# Patient Record
Sex: FEMALE | Race: WHITE | Employment: OTHER | URBAN - METROPOLITAN AREA
[De-identification: names, ages, dates, MRNs, and addresses within clinical notes are randomized per-mention and may not be internally consistent; named-entity substitution may affect disease eponyms.]

---

## 2017-03-23 ENCOUNTER — TELEPHONE (OUTPATIENT)
Dept: CARDIOLOGY | Facility: CLINIC | Age: 67
End: 2017-03-23

## 2017-09-13 ENCOUNTER — RADIANT APPOINTMENT (OUTPATIENT)
Dept: BONE DENSITY | Facility: CLINIC | Age: 67
End: 2017-09-13
Payer: COMMERCIAL

## 2017-09-13 DIAGNOSIS — Z78.0 ASYMPTOMATIC MENOPAUSAL STATE: ICD-10-CM

## 2017-09-13 PROCEDURE — 77080 DXA BONE DENSITY AXIAL: CPT | Performed by: INTERNAL MEDICINE

## 2017-09-16 ENCOUNTER — HEALTH MAINTENANCE LETTER (OUTPATIENT)
Age: 67
End: 2017-09-16

## 2019-04-30 ENCOUNTER — ANCILLARY PROCEDURE (OUTPATIENT)
Dept: BONE DENSITY | Facility: CLINIC | Age: 69
End: 2019-04-30
Payer: COMMERCIAL

## 2019-04-30 DIAGNOSIS — M81.0 AGE-RELATED OSTEOPOROSIS WITHOUT CURRENT PATHOLOGICAL FRACTURE: ICD-10-CM

## 2019-04-30 DIAGNOSIS — M85.80 OSTEOPENIA: ICD-10-CM

## 2019-04-30 DIAGNOSIS — Z85.3 HX OF BREAST CANCER: ICD-10-CM

## 2019-04-30 PROCEDURE — 77080 DXA BONE DENSITY AXIAL: CPT | Performed by: INTERNAL MEDICINE

## 2019-10-10 ENCOUNTER — CARE COORDINATION (OUTPATIENT)
Dept: GASTROENTEROLOGY | Facility: CLINIC | Age: 69
End: 2019-10-10

## 2019-12-16 ENCOUNTER — TRANSFERRED RECORDS (OUTPATIENT)
Dept: HEALTH INFORMATION MANAGEMENT | Facility: CLINIC | Age: 69
End: 2019-12-16

## 2020-08-06 ENCOUNTER — TRANSFERRED RECORDS (OUTPATIENT)
Dept: HEALTH INFORMATION MANAGEMENT | Facility: CLINIC | Age: 70
End: 2020-08-06

## 2020-08-20 ENCOUNTER — TRANSFERRED RECORDS (OUTPATIENT)
Dept: HEALTH INFORMATION MANAGEMENT | Facility: CLINIC | Age: 70
End: 2020-08-20

## 2020-10-05 ENCOUNTER — DOCUMENTATION ONLY (OUTPATIENT)
Dept: GASTROENTEROLOGY | Facility: CLINIC | Age: 70
End: 2020-10-05

## 2020-10-05 NOTE — PROGRESS NOTES
Received records and disk from [facility]. Sent records to Urgent scanning. Hand carried disk down to 1st floor imaging to be scanned in.    Keisha Crystal LPN

## 2021-01-28 ENCOUNTER — TELEPHONE (OUTPATIENT)
Dept: GASTROENTEROLOGY | Facility: CLINIC | Age: 71
End: 2021-01-28

## 2021-01-28 NOTE — TELEPHONE ENCOUNTER
[facility] Call Center    Phone Message    May a detailed message be left on voicemail: yes     Reason for Call: Other: Pt is requesting a humza back from Stephanie please. Pt is wanting to schedule a visit with Dr. Chao. Pt states she is becoming very upset as she has been trying to see Dr. Chao since the fall but has not heard back from the team. Pt states she had records sent from [facility], [facility], and [facility] Pt is requesting Stephanie give her a call to discuss if Dr. Chao will see her or if she should look into another provider. Please advise. Thank you!     Action Taken: Message routed to:  [facility] Panc and Bili    Travel Screening: Not Applicable

## 2021-01-29 NOTE — TELEPHONE ENCOUNTER
[facility]     Referring provider:  Phong Martinez MD      Referred to: [facility]     Provider Requested:  Dr. Chao     Referral Received: 1/28/20210     Records received: in CareEverywhere     Images received: none    Evaluation for: chronic pancreatitis     Clinical History (per RN review):     GI note from [place] 8/2020  Assessment:  #1 Chronic pancreatitis   #2 Remote history of tobacco and alcohol use  #3 Chronic abdominal pain   #4 Dilated CBD on endoscopic ultrasound in 2015  #5 Abnormal imaging of the pancreas on endoscopic ultrasound in 2015  It is certainly possible that she carries a diagnosis of chronic pancreatitis based on the abnormal imaging of the pancreas and the bile duct that was seen on the endoscopic ultrasound which was performed in 2015 in the setting of history of tobacco/alcohol use which are the typical risk factors for chronic pancreatitis and based on the nature of the abdominal pain. No evidence of pancreatic endocrine or exocrine insufficiency at this point. For further evaluation of her abdominal pain, would recommend an MRI/MRCP with secretin to better evaluate the pancreatic parenchyma and to evaluate for any local complications related to her chronic pancreatitis i.e. pancreatic ductal strictures or biliary strictures or pseudocysts that might be contributing to her debilitating pain despite her being on chronic narcotic use. I doubt that she carries a diagnosis of a gastroparesis given that was a 2-hour gastric emptying study that was performed while on chronic methadone use and the absence of any symptoms that are suggestive of gastroparesis. We discussed that MRI might be a better option than the CT given the potential of radiation in the CT scan, and we discussed that it is potentially safer option than the endoscopic ultrasound given the need for anesthesia. Despite her neck injury, she is able to lie flat for 45 minutes for an MRI and we  mutually agreed to proceed with that plan before we undergo any further testing    Her chronic pain is located in the epigastric region, radiates to the mid back, constant, ranges in intensity from 3-7 even while using pain medications, exacerbated by eating, not associated with any nausea or vomiting. No odynophagia or dysphagia, no weight loss, no hematemesis, melena, or hematochezia. She remembers being possibly diagnosed with chronic pancreatitis but this was not investigated further and she does not recall being on pancreatic enzyme replacement therapy and she denied any history of diabetes or pancreatic exocrine insufficiency.     [facility] 8/20/2020  Impression:   1. Unremarkable secretin MRCP. No pancreas divisum, or side branch visualization, with adequate duodenal outflow.  2. No gallstones in the gallbladder.  3. Intrahepatic and extrahepatic biliary dilatation, without obstructing calculus or mass. 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    Pt concerned w/ possible SOD  No noted elevations in LFTs during pain attacks    MD review date: clinic scheduled w/ Jeremie DIEHL Decision for clinic consultation/Orders:            Referral updates/Patient contacted: 1/29, called patient, she asked that I call back later in the afternoon

## 2021-03-17 ENCOUNTER — VIRTUAL VISIT (OUTPATIENT)
Dept: GASTROENTEROLOGY | Facility: CLINIC | Age: 71
End: 2021-03-17
Payer: COMMERCIAL

## 2021-03-17 VITALS — HEIGHT: 62 IN | BODY MASS INDEX: 25.4 KG/M2 | WEIGHT: 138 LBS

## 2021-03-17 DIAGNOSIS — R10.13 ABDOMINAL PAIN, EPIGASTRIC: Primary | ICD-10-CM

## 2021-03-17 PROCEDURE — 99205 OFFICE O/P NEW HI 60 MIN: CPT | Mod: 95 | Performed by: INTERNAL MEDICINE

## 2021-03-17 RX ORDER — SODIUM PHOSPHATE,MONO-DIBASIC 19G-7G/118
1 ENEMA (ML) RECTAL DAILY
COMMUNITY

## 2021-03-17 ASSESSMENT — PAIN SCALES - GENERAL: PAINLEVEL: SEVERE PAIN (7)

## 2021-03-17 ASSESSMENT — MIFFLIN-ST. JEOR: SCORE: 1104.21

## 2021-03-17 NOTE — LETTER
3/17/2021         RE: Cindy Chung  [address]  [address]  [place] [address]        Dear Colleague,    Thank you for referring your patient, Cindy Chung, to the [facility] [place]. Please see a copy of my visit note below.    Rick is a 71 year old who is being evaluated via a billable video visit.      Please send link invite to: [email or web address]  How would you like to obtain your AVS? Mail a copy  If the video visit is dropped, the invitation should be resent by: Send to e-mail at: [email or web address]  Will anyone else be joining your video visit? No    Video-Visit Details    Type of service:  Video Visit  Video Start Time: 1:30  Video End Time: 2:35    Originating Location (pt. Location): Home    Distant Location (provider location):  [facility] [place]     Rick is a very pleasant 71-year-old referred by her primary Dr. Rockwell for evaluation of longstanding right upper quadrant pain.  The referral was prompted by patient's word of mouth with providers at [facility].    At her excellent assessment by Dr. Martinez at [facility] is copied and pasted below to avoid redundancy and ensure detail    Her pain started quite a few years ago and is primarily epigastric radiating through to the back, constant, and is clearly exacerbated by eating.  It has become the dominant factor in her quality of life.  She is a retired  was largely disabled due to a cervical spine injury occurring almost 20 years ago.  She is managed by her primary physician and is on a number of neuro modulators including Lyrica, antidepressants including Zoloft and Cymbalta, and uses Percocet 5 mg of oxycodone three times a day.  She is quite limited by her pain in terms of engagement in activities.    Her work-up has been extensive and performed at multiple places including [facility], [facility] [place] [facility], salient findings going back  almost 20 years included a significantly dilated common bile duct of 11.5 mm, and intact gallbladder but without sludge or stones, normal liver enzymes when checked, and an endoscopic ultrasound at [facility] in 2014 by Dr. Gillette showing 5 out of 9 criteria for chronic pancreatitis and the persistently dilated common bile duct at 11.5 mm even at EUS.  Recent secretin MRCP done at [facility] [place] with advanced technique and interpretation suggest no chronic pancreatitis including normal T1 signal.    She also had a mildly delayed gastric emptying but was on narcotics at that time.  She was thought to have possible chronic pancreatitis and likely as a result of past history of heavy alcohol use and smoking.    To her knowledge she has not been tried on pancreatic enzymes although that has been mentioned by several of her GI consultants.  Her stools tend towards constipation with no suggestion of gross steatorrhea.    The rest of the history is as outlined below.    Impression: We spent 65 minutes more than 50% video counseling evaluating her extensive history, imaging, findings and discussing possible diagnoses.  Her clinical history is quite suggestive of chronic pancreatitis.  The EUS is fairly diagnostic as 5 out of 9 criteria have been shown to have 85+ percent specificity.  However the secretin MRCP which is generally the most accurate test for noncalcific disease was entirely normal.    We discussed that another possibility would be gallbladder dyskinesia and papillary stenosis although the constant nature of the pain and radiation to the back are more suggestive of chronic pancreatitis, the postprandial component may suggest the latter.  I agree with Dr. Martinez that this is unlikely to represent gastroparesis and the mildly delayed gastric emptying is probably result of narcotics    Recommendation:  #1 [facility] HIDA scan  #2 EUS with pancreatic function tests  #3 follow-up with me to consider  cholecystectomy versus trial of pancreatic enzyme replacement therapy  #4 we did discuss that if we go the cholecystectomy route that may actually exacerbate pain if there is a component of papillary stenosis and not that would follow with the likely ERCP but not to be considered while her gallbladder is intact  #5 we discussed that there is a very strong possibility that no specific cause that can be treated anatomically will be found          Progress Notes  - documented in this encounter  Phong Martinez MD - 08/06/2020 2:00 PM CDT  Formatting of this note might be different from the original.  [facility] INITIAL VISIT NOTE - Staff    Assessment:  #1 Chronic pancreatitis   #2 Remote history of tobacco and alcohol use  #3 Chronic abdominal pain   #4 Dilated CBD on endoscopic ultrasound in 2015  #5 Abnormal imaging of the pancreas on endoscopic ultrasound in 2015  It is certainly possible that she carries a diagnosis of chronic pancreatitis based on the abnormal imaging of the pancreas and the bile duct that was seen on the endoscopic ultrasound which was performed in 2015 in the setting of history of tobacco/alcohol use which are the typical risk factors for chronic pancreatitis and based on the nature of the abdominal pain. No evidence of pancreatic endocrine or exocrine insufficiency at this point. For further evaluation of her abdominal pain, would recommend an MRI/MRCP with secretin to better evaluate the pancreatic parenchyma and to evaluate for any local complications related to her chronic pancreatitis i.e. pancreatic ductal strictures or biliary strictures or pseudocysts that might be contributing to her debilitating pain despite her being on chronic narcotic use. I doubt that she carries a diagnosis of a gastroparesis given that was a 2-hour gastric emptying study that was performed while on chronic methadone use and the absence of any symptoms that are suggestive of gastroparesis. We discussed that  MRI might be a better option than the CT given the potential of radiation in the CT scan, and we discussed that it is potentially safer option than the endoscopic ultrasound given the need for anesthesia. Despite her neck injury, she is able to lie flat for 45 minutes for an MRI and we mutually agreed to proceed with that plan before we undergo any further testing    Recommendations:  MRI/MRCP with secretin to evaluate the bile duct, pancreatic pancreatic duct, and pancreatic parenchyma to evaluate for any local complications of chronic pancreatitis  Further follow-up will depend on the MRI/MRCP findings    Self referral     HPI:  Cindy Chung is a 70-year-old female who presented to our clinic for evaluation of chronic abdominal pain and suspected chronic pancreatitis. Her past medical problems include gastroparesis, chronic pain, dependent on methadone which was complicated by severe central sleep apnea and eventually switched to Percocet, chronic neck pain, chronic constipation, history of pyelonephritis, history of cholesteatoma, and history of breast cancer in 2001 s/p lumpectomy and chemoradiation. Issues with chronic pain started after neck injury with C6/C7 laminectomy and fusion.   Her chronic pain is located in the epigastric region, radiates to the mid back, constant, ranges in intensity from 3-7 even while using pain medications, exacerbated by eating, not associated with any nausea or vomiting. No odynophagia or dysphagia, no weight loss, no hematemesis, melena, or hematochezia. She remembers being possibly diagnosed with chronic pancreatitis but this was not investigated further and she does not recall being on pancreatic enzyme replacement therapy and she denied any history of diabetes or pancreatic exocrine insufficiency.     Regarding her chronic constipation, she is able to manage her symptoms by using a combination of MiraLAX and Benefiber. Regarding her substance use history, she has about  35-pack-year history of smoking and she quit in 1996 and history of heavy alcohol use in the mid to late 90s due to depression and between the age of 20 and 22. No current tobacco or alcohol use. Maternal grandfather with colon cancer at the age of 67, grandmother with breast cancer, no family history of pancreatic cancer or chronic pancreatitis/     NM gastric emptying study 4/21/2014 ([place])  FINDINGS: There is abnormal gastric emptying with T-1/2 of 118  minutes. At 60 minutes 23 % had emptied from the stomach. No reflux  reflux was observed during the monitoring period.    IMPRESSION  IMPRESSION: Abnormal gastric emptying with T-1/2 time of 118 minutes.    Colonoscopy 1/31/2014 ([place])  Impression:               - Preparation of the colon was fair.                           - Redundant colon.                           - Stool ascending colon and cecum.  Recommendation:           - Repeat colonoscopy in 10 years for screening                            purposes.                                          EUS 1/31/2014 ([place])  Impression:               - Endosonographic imaging of the pancreas showed 5                            of 9 sonographic changes of chronic pancreatitis.                            These findings are suggestive of chronic                            pancreatitis                           - Dilated CBD with mildly prominent but otherwise                            normal gallbladder. No stones or sludge.                            Microlithiasis cannot be completely excluded  Recommendation:           - Discharge patient to home.                           - Trial of pancreatic enzymes                           - Follow-up in clinic in one month                           - If no improvement in symptoms, can consider ERCP    MR MRCP WITH SECRETIN8/20/2020  [facility]  Result Impression   Impression:   1. Unremarkable secretin MRCP. No pancreas divisum, or side branch  visualization, with adequate duodenal outflow.  2. No gallstones in the gallbladder.  3. Intrahepatic and extrahepatic biliary dilatation, without obstructing calculus or mass. 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    If you are the patient, and wish to discuss this report with a radiologist, please call [phone] between 8 am and 4 pm on regular working days.       [place] Radiologist: Dante Espinal    Result Narrative   Clinical Indication: Chronic pancreatitis.    Comparison: None.    Technique:   Precontrast sequences are as follows:  Coronal bTFE gradient echo, coronal T2 MAC, axial SS T2 SPAIR with fat sat, axial in-phase and out of phase, axial DWI, MRCP high resolution coronal (with MIP and 3D reconstructions), single shot thick slab MRCP and axial THRIVE.    Following the uncomplicated administration of intravenous secretin, dynamic MRCP sequences obtained through 10 minutes.    Following the uncomplicated administration of intravenous gadolinium contrast, dynamic THRIVE sequences obtained through 5 minutes.    Findings:     Liver: Normal signal intensity. No focal mass.    Gallbladder and biliary system: No gallstones. There is intrahepatic or extra-hepatic biliary dilatation, with the common duct measuring up to 1.1 cm. No filling defect or mass identified.    Pancreas: Preserved T1 signal, with no definite atrophy. No focal mass. No pancreas divisum.    Pancreatic ductal dimensions are as follows  Presecretin: 1.3 mm in the head, 1.1 mm in the body, 0.8 mm in the tail  Peak dilatation after secretin (at 2 minutes): 2 mm in the head, 1.5 mm in the body, 1 mm in the tail    No side branch visualization. Pancreatic duct returns to baseline at 7 minutes. There is prompt and adequate duodenal outflow.    Spleen: Within normal limits.      Adrenal glands: Within normal limits.      Kidneys: Within normal limits.      Major blood vessels: Patent celiac artery, SMA and portal vein.    Free fluid:  None.    Incidental note of severe constipation.   Other Result Information   Interface, [facility] - 08/20/2020  3:47 PM CDT  Clinical Indication: Chronic pancreatitis.    Comparison: None.    Technique:   Precontrast sequences are as follows:  Coronal bTFE gradient echo, coronal T2 MAC, axial SS T2 SPAIR with fat sat, axial in-phase and out of phase, axial DWI, MRCP high resolution coronal (with MIP and 3D reconstructions), single shot thick slab MRCP and axial THRIVE.    Following the uncomplicated administration of intravenous secretin, dynamic MRCP sequences obtained through 10 minutes.    Following the uncomplicated administration of intravenous gadolinium contrast, dynamic THRIVE sequences obtained through 5 minutes.    Findings:     Liver: Normal signal intensity. No focal mass.    Gallbladder and biliary system: No gallstones. There is intrahepatic or extra-hepatic biliary dilatation, with the common duct measuring up to 1.1 cm. No filling defect or mass identified.    Pancreas: Preserved T1 signal, with no definite atrophy. No focal mass. No pancreas divisum.    Pancreatic ductal dimensions are as follows  Presecretin: 1.3 mm in the head, 1.1 mm in the body, 0.8 mm in the tail  Peak dilatation after secretin (at 2 minutes): 2 mm in the head, 1.5 mm in the body, 1 mm in the tail    No side branch visualization. Pancreatic duct returns to baseline at 7 minutes. There is prompt and adequate duodenal outflow.    Spleen: Within normal limits.      Adrenal glands: Within normal limits.      Kidneys: Within normal limits.      Major blood vessels: Patent celiac artery, SMA and portal vein.    Free fluid: None.    Incidental note of severe constipation.    IMPRESSION  Impression:   1. Unremarkable secretin MRCP. No pancreas divisum, or side branch visualization, with adequate duodenal outflow.  2. No gallstones in the gallbladder.  3. Intrahepatic and extrahepatic biliary dilatation, without obstructing  calculus or mass. 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    If you are the patient, and wish to discuss this report with a radiologist, please call [phone] between 8 am and 4 pm on regular working days.       [place] Radiologist: Dante Espinal                                                                                          PMBRISA, FH, SH Reviewed and Updated, Non-contributory unless otherwise mentioned     Past medical history:  Neck injury, C6/C7 status post fusion  Breast cancer status post lumpectomy/chemoradiation  Chronic constipation  Cholesteatoma  History of pyelonephritis  Chronic abdominal pain  ? Gastroparesis    Past surgical history;  C6-C7 fusion  Cholesteatoma removal  Exploratory laparotomy    Family history:  Colon cancer in maternal grandfather at the age of 67, grandmother with breast cancer    Occupational History     Not on file   Tobacco Use     Smoking status: Not on file   Substance and Sexual Activity     Alcohol use: Not on file     Drug use: Not on file     Sexual activity: Not on file   Social History Narrative     Not on file     ROS: 10 Point Review of Systems Performed. Pertinent positives and negatives in HPI, all other systems negative.        Labs:    I have reviewed labs, radiology tests, other procedures, and outside records: reviewed        Again, thank you for allowing me to participate in the care of your patient.      Sincerely,    Diego Chao MD

## 2021-03-17 NOTE — PROGRESS NOTES
Rick is a 71 year old who is being evaluated via a billable video visit.      Please send link invite to: [email or web address]  How would you like to obtain your AVS? Mail a copy  If the video visit is dropped, the invitation should be resent by: Send to e-mail at: [email or web address]  Will anyone else be joining your video visit? No        Video Start Time:   Video-Visit Details    Type of service:  Video Visit  Video Start Time: 1:30  Video End Time: 2:35    Originating Location (pt. Location): Home    Distant Location (provider location):  [facility] [place]       Rick is a very pleasant 71-year-old referred by her primary Dr. Rockwell for evaluation of longstanding right upper quadrant pain.  The referral was prompted by patient's word of mouth with providers at [facility].    At her excellent assessment by Dr. Martinez at [facility] is copied and pasted below to avoid redundancy and ensure detail    Her pain started quite a few years ago and is primarily epigastric radiating through to the back, constant, and is clearly exacerbated by eating.  It has become the dominant factor in her quality of life.  She is a retired  was largely disabled due to a cervical spine injury occurring almost 20 years ago.  She is managed by her primary physician and is on a number of neuro modulators including Lyrica, antidepressants including Zoloft and Cymbalta, and uses Percocet 5 mg of oxycodone three times a day.  She is quite limited by her pain in terms of engagement in activities.    Her work-up has been extensive and performed at multiple places including [place], [place] [facility], [facility], salient findings going back almost 20 years included a significantly dilated common bile duct of 11.5 mm, and intact gallbladder but without sludge or stones, normal liver enzymes when checked, and an endoscopic ultrasound at [facility] in 2014 by Dr. Gillette  showing 5 out of 9 criteria for chronic pancreatitis and the persistently dilated common bile duct at 11.5 mm even at EUS.  Recent secretin MRCP done at [facility] with advanced technique and interpretation suggest no chronic pancreatitis including normal T1 signal.    She also had a mildly delayed gastric emptying but was on narcotics at that time.  She was thought to have possible chronic pancreatitis and likely as a result of past history of heavy alcohol use and smoking.    To her knowledge she has not been tried on pancreatic enzymes although that has been mentioned by several of her GI consultants.  Her stools tend towards constipation with no suggestion of gross steatorrhea.    The rest of the history is as outlined below.    Impression: We spent 65 minutes more than 50% video counseling evaluating her extensive history, imaging, findings and discussing possible diagnoses.  Her clinical history is quite suggestive of chronic pancreatitis.  The EUS is fairly diagnostic as 5 out of 9 criteria have been shown to have 85+ percent specificity.  However the secretin MRCP which is generally the most accurate test for noncalcific disease was entirely normal.    We discussed that another possibility would be gallbladder dyskinesia and papillary stenosis although the constant nature of the pain and radiation to the back are more suggestive of chronic pancreatitis, the postprandial component may suggest the latter.  I agree with Dr. aMrtinez that this is unlikely to represent gastroparesis and the mildly delayed gastric emptying is probably result of narcotics    Recommendation:  #1 [facility] HIDA scan  #2 EUS with pancreatic function tests  #3 follow-up with me to consider cholecystectomy versus trial of pancreatic enzyme replacement therapy  #4 we did discuss that if we go the cholecystectomy route that may actually exacerbate pain if there is a component of papillary stenosis and not that would follow with the likely ERCP but not  to be considered while her gallbladder is intact  #5 we discussed that there is a very strong possibility that no specific cause that can be treated anatomically will be found          Progress Notes  - documented in this encounter  Phong Martinez MD - 08/06/2020 2:00 PM CDT  Formatting of this note might be different from the original.  [facility] INITIAL VISIT NOTE - Staff    Assessment:  #1 Chronic pancreatitis   #2 Remote history of tobacco and alcohol use  #3 Chronic abdominal pain   #4 Dilated CBD on endoscopic ultrasound in 2015  #5 Abnormal imaging of the pancreas on endoscopic ultrasound in 2015  It is certainly possible that she carries a diagnosis of chronic pancreatitis based on the abnormal imaging of the pancreas and the bile duct that was seen on the endoscopic ultrasound which was performed in 2015 in the setting of history of tobacco/alcohol use which are the typical risk factors for chronic pancreatitis and based on the nature of the abdominal pain. No evidence of pancreatic endocrine or exocrine insufficiency at this point. For further evaluation of her abdominal pain, would recommend an MRI/MRCP with secretin to better evaluate the pancreatic parenchyma and to evaluate for any local complications related to her chronic pancreatitis i.e. pancreatic ductal strictures or biliary strictures or pseudocysts that might be contributing to her debilitating pain despite her being on chronic narcotic use. I doubt that she carries a diagnosis of a gastroparesis given that was a 2-hour gastric emptying study that was performed while on chronic methadone use and the absence of any symptoms that are suggestive of gastroparesis. We discussed that MRI might be a better option than the CT given the potential of radiation in the CT scan, and we discussed that it is potentially safer option than the endoscopic ultrasound given the need for anesthesia. Despite her neck injury, she is able to lie flat for 45  minutes for an MRI and we mutually agreed to proceed with that plan before we undergo any further testing    Recommendations:  MRI/MRCP with secretin to evaluate the bile duct, pancreatic pancreatic duct, and pancreatic parenchyma to evaluate for any local complications of chronic pancreatitis  Further follow-up will depend on the MRI/MRCP findings    Self referral     HPI:  Cindy Chung is a 70-year-old female who presented to our clinic for evaluation of chronic abdominal pain and suspected chronic pancreatitis. Her past medical problems include gastroparesis, chronic pain, dependent on methadone which was complicated by severe central sleep apnea and eventually switched to Percocet, chronic neck pain, chronic constipation, history of pyelonephritis, history of cholesteatoma, and history of breast cancer in 2001 s/p lumpectomy and chemoradiation. Issues with chronic pain started after neck injury with C6/C7 laminectomy and fusion.   Her chronic pain is located in the epigastric region, radiates to the mid back, constant, ranges in intensity from 3-7 even while using pain medications, exacerbated by eating, not associated with any nausea or vomiting. No odynophagia or dysphagia, no weight loss, no hematemesis, melena, or hematochezia. She remembers being possibly diagnosed with chronic pancreatitis but this was not investigated further and she does not recall being on pancreatic enzyme replacement therapy and she denied any history of diabetes or pancreatic exocrine insufficiency.     Regarding her chronic constipation, she is able to manage her symptoms by using a combination of MiraLAX and Benefiber. Regarding her substance use history, she has about 35-pack-year history of smoking and she quit in 1996 and history of heavy alcohol use in the mid to late 90s due to depression and between the age of 20 and 22. No current tobacco or alcohol use. Maternal grandfather with colon cancer at the age of 67,  grandmother with breast cancer, no family history of pancreatic cancer or chronic pancreatitis/     NM gastric emptying study 4/21/2014 ([facility])  FINDINGS: There is abnormal gastric emptying with T-1/2 of 118  minutes. At 60 minutes 23 % had emptied from the stomach. No reflux  reflux was observed during the monitoring period.    IMPRESSION  IMPRESSION: Abnormal gastric emptying with T-1/2 time of 118 minutes.    Colonoscopy 1/31/2014 ([facility])  Impression:               - Preparation of the colon was fair.                           - Redundant colon.                           - Stool ascending colon and cecum.  Recommendation:           - Repeat colonoscopy in 10 years for screening                            purposes.                                          EUS 1/31/2014 ([facility])  Impression:               - Endosonographic imaging of the pancreas showed 5                            of 9 sonographic changes of chronic pancreatitis.                            These findings are suggestive of chronic                            pancreatitis                           - Dilated CBD with mildly prominent but otherwise                            normal gallbladder. No stones or sludge.                            Microlithiasis cannot be completely excluded  Recommendation:           - Discharge patient to home.                           - Trial of pancreatic enzymes                           - Follow-up in clinic in one month                           - If no improvement in symptoms, can consider ERCP    MR MRCP WITH SECRETIN8/20/2020  [facility]  Result Impression   Impression:   1. Unremarkable secretin MRCP. No pancreas divisum, or side branch visualization, with adequate duodenal outflow.  2. No gallstones in the gallbladder.  3. Intrahepatic and extrahepatic biliary dilatation, without obstructing calculus or mass. 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    If you are the patient, and wish to  discuss this report with a radiologist, please call [phone] between 8 am and 4 pm on regular working days.       [place] Radiologist: Dante Espinal    Result Narrative   Clinical Indication: Chronic pancreatitis.    Comparison: None.    Technique:   Precontrast sequences are as follows:  Coronal bTFE gradient echo, coronal T2 MAC, axial SS T2 SPAIR with fat sat, axial in-phase and out of phase, axial DWI, MRCP high resolution coronal (with MIP and 3D reconstructions), single shot thick slab MRCP and axial THRIVE.    Following the uncomplicated administration of intravenous secretin, dynamic MRCP sequences obtained through 10 minutes.    Following the uncomplicated administration of intravenous gadolinium contrast, dynamic THRIVE sequences obtained through 5 minutes.    Findings:     Liver: Normal signal intensity. No focal mass.    Gallbladder and biliary system: No gallstones. There is intrahepatic or extra-hepatic biliary dilatation, with the common duct measuring up to 1.1 cm. No filling defect or mass identified.    Pancreas: Preserved T1 signal, with no definite atrophy. No focal mass. No pancreas divisum.    Pancreatic ductal dimensions are as follows  Presecretin: 1.3 mm in the head, 1.1 mm in the body, 0.8 mm in the tail  Peak dilatation after secretin (at 2 minutes): 2 mm in the head, 1.5 mm in the body, 1 mm in the tail    No side branch visualization. Pancreatic duct returns to baseline at 7 minutes. There is prompt and adequate duodenal outflow.    Spleen: Within normal limits.      Adrenal glands: Within normal limits.      Kidneys: Within normal limits.      Major blood vessels: Patent celiac artery, SMA and portal vein.    Free fluid: None.    Incidental note of severe constipation.   Other Result Information   Interface, [facility] - 08/20/2020  3:47 PM CDT  Clinical Indication: Chronic pancreatitis.    Comparison: None.    Technique:   Precontrast sequences are as follows:  Coronal  bTFE gradient echo, coronal T2 MAC, axial SS T2 SPAIR with fat sat, axial in-phase and out of phase, axial DWI, MRCP high resolution coronal (with MIP and 3D reconstructions), single shot thick slab MRCP and axial THRIVE.    Following the uncomplicated administration of intravenous secretin, dynamic MRCP sequences obtained through 10 minutes.    Following the uncomplicated administration of intravenous gadolinium contrast, dynamic THRIVE sequences obtained through 5 minutes.    Findings:     Liver: Normal signal intensity. No focal mass.    Gallbladder and biliary system: No gallstones. There is intrahepatic or extra-hepatic biliary dilatation, with the common duct measuring up to 1.1 cm. No filling defect or mass identified.    Pancreas: Preserved T1 signal, with no definite atrophy. No focal mass. No pancreas divisum.    Pancreatic ductal dimensions are as follows  Presecretin: 1.3 mm in the head, 1.1 mm in the body, 0.8 mm in the tail  Peak dilatation after secretin (at 2 minutes): 2 mm in the head, 1.5 mm in the body, 1 mm in the tail    No side branch visualization. Pancreatic duct returns to baseline at 7 minutes. There is prompt and adequate duodenal outflow.    Spleen: Within normal limits.      Adrenal glands: Within normal limits.      Kidneys: Within normal limits.      Major blood vessels: Patent celiac artery, SMA and portal vein.    Free fluid: None.    Incidental note of severe constipation.    IMPRESSION  Impression:   1. Unremarkable secretin MRCP. No pancreas divisum, or side branch visualization, with adequate duodenal outflow.  2. No gallstones in the gallbladder.  3. Intrahepatic and extrahepatic biliary dilatation, without obstructing calculus or mass. 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    If you are the patient, and wish to discuss this report with a radiologist, please call [phone] between 8 am and 4 pm on regular working days.       [place] Radiologist: Dante Espinal                                                                                           PMH, FH, SH Reviewed and Updated, Non-contributory unless otherwise mentioned     Past medical history:  Neck injury, C6/C7 status post fusion  Breast cancer status post lumpectomy/chemoradiation  Chronic constipation  Cholesteatoma  History of pyelonephritis  Chronic abdominal pain  ? Gastroparesis    Past surgical history;  C6-C7 fusion  Cholesteatoma removal  Exploratory laparotomy    Family history:  Colon cancer in maternal grandfather at the age of 67, grandmother with breast cancer    Occupational History     Not on file   Tobacco Use     Smoking status: Not on file   Substance and Sexual Activity     Alcohol use: Not on file     Drug use: Not on file     Sexual activity: Not on file   Social History Narrative     Not on file     ROS: 10 Point Review of Systems Performed. Pertinent positives and negatives in HPI, all other systems negative.        Labs:    I have reviewed labs, radiology tests, other procedures, and outside records: reviewed

## 2021-03-17 NOTE — NURSING NOTE
"Chief Complaint   Patient presents with     New Patient     abd pain, SOD?       Vitals:    03/17/21 1230   Weight: 62.6 kg (138 lb)   Height: 1.575 m (5' 2\")       Body mass index is 25.24 kg/m .      Barrera Mantilla LPN                        "

## 2021-03-17 NOTE — PATIENT INSTRUCTIONS
Follow up:    Dr. Chao has outlined the following steps after your recent clinic visit:    -Schedule to have a HIDA scan    -Schedule for an EUS -[facility] will call [facility] to schedule    -Follow-up with Dr. Chao after to consider cholecystectomy versus trial of pancreatic enzyme replacement therapy      Please call with any questions or concerns regarding your clinic visit today.    It is a pleasure being involved in your health care.    Contacts post-consultation depending on your need:    Schedule Clinic Appointments            [phone] # 1   M-F 7:30 - 5 pm    Stephanie Monson RN Care Coordinator  [phone]    Barrera Mantilla LPN    [phone]     OR Procedure Scheduling                             [phone]    My Chart is available 24 hours a day and is a secure way to access your records and communicate with your care team.  I strongly recommend signing up if you haven't already done so, if you are comfortable with computers.  If you would like to inquire about this or are having problems with My Chart access, you may call [phone] or go online at [email or web address].  Please allow at least 24 hours for a response and extra time on weekends and Holidays.

## 2021-03-18 ENCOUNTER — PREP FOR PROCEDURE (OUTPATIENT)
Dept: GASTROENTEROLOGY | Facility: CLINIC | Age: 71
End: 2021-03-18

## 2021-03-18 ENCOUNTER — TELEPHONE (OUTPATIENT)
Dept: GASTROENTEROLOGY | Facility: CLINIC | Age: 71
End: 2021-03-18

## 2021-03-18 DIAGNOSIS — Z11.59 ENCOUNTER FOR SCREENING FOR OTHER VIRAL DISEASES: Primary | ICD-10-CM

## 2021-03-18 DIAGNOSIS — R10.84 ABDOMINAL PAIN, GENERALIZED: Primary | ICD-10-CM

## 2021-03-18 NOTE — TELEPHONE ENCOUNTER
Called Pt to follow up on clinic visit with Dr. Chao. Went over plan/orders with Pt.    Pt will call the Radiology scheduling line to schedule the HIDA scan.    Discussed about scheduling for EUS with panc protocol. Pt would like to 4/5/21 with Dr. Pantoja -preferably in the early afternoon. Explained they can expect a call for the time when closer to procedure date, will need a , someone to stay with them for 24 hours and should stay in town for 24 hours (within 45 min of Hospital) post procedure     Patient needs to get pre-op physical completed and will fax a copy to us along with bringing a hard copy with them. Fax number given. [phone] *If you do not get a preop physical, your procedure could be cancelled, patient voiced understanding*     Preop Plan: Pt will schedule with her PCP    Med Review     Blood thinner - no  ASA - no   Diabetic - no     COVID test discussed: Pt knows to schedule COVID test within 4 days of procedure.    Patient Education r/t procedure:     Does patient have any history of gastric bypass/gastric surgery/altered panc/bili anatomy? no    A pre-op nurse will call 1-2 days prior to the procedure. Is advised to be NPO/no solid food 8 hours before the procedure. Ok to drink clear liquids (Water, Apple Juice or Gatorade) up to 2 hours prior to procedure.     Other specific details/comments:     Verbalized understanding of all instructions. All questions answered.     Barrera Mantilla LPN

## 2021-03-23 ENCOUNTER — HOSPITAL ENCOUNTER (OUTPATIENT)
Dept: NUCLEAR MEDICINE | Facility: CLINIC | Age: 71
Setting detail: NUCLEAR MEDICINE
Discharge: HOME | End: 2021-03-23
Attending: INTERNAL MEDICINE | Admitting: INTERNAL MEDICINE
Payer: COMMERCIAL

## 2021-03-23 DIAGNOSIS — R10.13 ABDOMINAL PAIN, EPIGASTRIC: ICD-10-CM

## 2021-03-23 PROCEDURE — A9537 TC99M MEBROFENIN: HCPCS | Performed by: INTERNAL MEDICINE

## 2021-03-23 PROCEDURE — 78227 HEPATOBIL SYST IMAGE W/DRUG: CPT | Mod: 26 | Performed by: RADIOLOGY

## 2021-03-23 PROCEDURE — 343N000001 HC RX 343: Performed by: INTERNAL MEDICINE

## 2021-03-23 PROCEDURE — 78227 HEPATOBIL SYST IMAGE W/DRUG: CPT

## 2021-03-23 RX ORDER — KIT FOR THE PREPARATION OF TECHNETIUM TC 99M MEBROFENIN 45 MG/10ML
5 INJECTION, POWDER, LYOPHILIZED, FOR SOLUTION INTRAVENOUS ONCE
Status: COMPLETED | OUTPATIENT
Start: 2021-03-23 | End: 2021-03-23

## 2021-03-23 RX ADMIN — MEBROFENIN 5 MILLICURIE: 45 INJECTION, POWDER, LYOPHILIZED, FOR SOLUTION INTRAVENOUS at 13:29

## 2021-03-29 ENCOUNTER — TELEPHONE (OUTPATIENT)
Dept: GASTROENTEROLOGY | Facility: CLINIC | Age: 71
End: 2021-03-29

## 2021-03-29 NOTE — TELEPHONE ENCOUNTER
Returned call to patient, HIDA scan:  Impression:     Normal hepatobiliary scan without evidence for acute or chronic  Cholecystitis.    Pt has EUS w/ PFT's scheduled on 4/5    Clinic scheduled via video visit on 5/5 with Dr. Chao to wrap up.    ML

## 2021-04-01 DIAGNOSIS — Z11.59 ENCOUNTER FOR SCREENING FOR OTHER VIRAL DISEASES: ICD-10-CM

## 2021-04-01 LAB
SARS-COV-2 RNA RESP QL NAA+PROBE: NORMAL
SPECIMEN SOURCE: NORMAL

## 2021-04-01 PROCEDURE — U0003 INFECTIOUS AGENT DETECTION BY NUCLEIC ACID (DNA OR RNA); SEVERE ACUTE RESPIRATORY SYNDROME CORONAVIRUS 2 (SARS-COV-2) (CORONAVIRUS DISEASE [COVID-19]), AMPLIFIED PROBE TECHNIQUE, MAKING USE OF HIGH THROUGHPUT TECHNOLOGIES AS DESCRIBED BY CMS-2020-01-R: HCPCS | Mod: 90 | Performed by: PATHOLOGY

## 2021-04-01 PROCEDURE — U0005 INFEC AGEN DETEC AMPLI PROBE: HCPCS | Mod: 90 | Performed by: PATHOLOGY

## 2021-04-01 PROCEDURE — 99000 SPECIMEN HANDLING OFFICE-LAB: CPT | Performed by: PATHOLOGY

## 2021-04-02 LAB
LABORATORY COMMENT REPORT: NORMAL
SARS-COV-2 RNA RESP QL NAA+PROBE: NEGATIVE
SPECIMEN SOURCE: NORMAL

## 2021-04-02 NOTE — BRIEF OP NOTE
[facility] Brief Operative Note    Pre-operative diagnosis: Abdominal pain, generalized [R10.84]   Post-operative diagnosis Indeterminate for CP   Procedure: Procedure(s):  ENDOSCOPIC ULTRASOUND, ESOPHAGOSCOPY / UPPER GASTROINTESTINAL TRACT (GI)  ESOPHAGOGASTRODUODENOSCOPY, WITH CHOLECYSTOKININ-STIMULATED PANCREATIC FUNCTION TEST   Surgeon: MARIOLA Liang   Assistants(s): Clay Chacon MD   Estimated blood loss: None    Specimens: Yes       Findings:  - Endoscopic ultrasound of pancreas remarkable for lobularity without honeycombing, hyperechoic foci without shadowing, and hyperechoic strands. No visible stones, cysts, duct dilation, side-branches, hyperechoic duct margins, or duct irregularity  - Normal pancreas function testing.    Recommendations:  - Observe in PACU and discharge.    Clay Chacon MD on 4/5/2021 at 11:47 AM    History:  70 yo F w/ h/o breast carcinoma (2001) s/p lumpectomy & chemoradiation, cervical neck disease s/p C6/C7 laminectomy/fusion, & chronic epigastric abdominal pain, w/ radiation to back, on narcotic analgesia. S/P EUS w/ 5/9 [place] criteria suggestive of CP; dilated CBD 11-mm w/ intact GB (1/31/14). S/P s-MRCP w/o evidence of CP (8/20/20). S/P normal nuclear hepatobiliary scan (3/23/21). Today (4/2/21), patient presents for EUS w/ PFTs.

## 2021-04-04 ENCOUNTER — ANESTHESIA EVENT (OUTPATIENT)
Dept: SURGERY | Facility: CLINIC | Age: 71
End: 2021-04-04
Payer: COMMERCIAL

## 2021-04-05 ENCOUNTER — ANESTHESIA (OUTPATIENT)
Dept: SURGERY | Facility: CLINIC | Age: 71
End: 2021-04-05
Payer: COMMERCIAL

## 2021-04-05 ENCOUNTER — HOSPITAL ENCOUNTER (OUTPATIENT)
Facility: CLINIC | Age: 71
Discharge: HOME | End: 2021-04-05
Attending: INTERNAL MEDICINE | Admitting: INTERNAL MEDICINE
Payer: COMMERCIAL

## 2021-04-05 VITALS
TEMPERATURE: 97.6 F | HEIGHT: 62 IN | HEART RATE: 67 BPM | SYSTOLIC BLOOD PRESSURE: 103 MMHG | WEIGHT: 134.48 LBS | BODY MASS INDEX: 24.75 KG/M2 | DIASTOLIC BLOOD PRESSURE: 68 MMHG | OXYGEN SATURATION: 96 % | RESPIRATION RATE: 16 BRPM

## 2021-04-05 DIAGNOSIS — R10.84 ABDOMINAL PAIN, GENERALIZED: ICD-10-CM

## 2021-04-05 LAB
CO2 FLD-SCNC: 156 MMOL/L
CO2 FLD-SCNC: 61 MMOL/L
CO2 FLD-SCNC: 71 MMOL/L
CO2 FLD-SCNC: 81 MMOL/L
GLUCOSE BLDC GLUCOMTR-MCNC: 85 MG/DL (ref 70–99)
INR PPP: 1.1 (ref 0.86–1.14)
SPECIMEN SOURCE FLD: NORMAL
UPPER EUS: NORMAL

## 2021-04-05 PROCEDURE — 360N000075 HC SURGERY LEVEL 2, PER MIN: Performed by: INTERNAL MEDICINE

## 2021-04-05 PROCEDURE — 85610 PROTHROMBIN TIME: CPT | Performed by: STUDENT IN AN ORGANIZED HEALTH CARE EDUCATION/TRAINING PROGRAM

## 2021-04-05 PROCEDURE — 82962 GLUCOSE BLOOD TEST: CPT

## 2021-04-05 PROCEDURE — 710N000010 HC RECOVERY PHASE 1, LEVEL 2, PER MIN: Performed by: INTERNAL MEDICINE

## 2021-04-05 PROCEDURE — 250N000025 HC SEVOFLURANE, PER MIN: Performed by: INTERNAL MEDICINE

## 2021-04-05 PROCEDURE — 250N000009 HC RX 250: Performed by: INTERNAL MEDICINE

## 2021-04-05 PROCEDURE — 272N000001 HC OR GENERAL SUPPLY STERILE: Performed by: INTERNAL MEDICINE

## 2021-04-05 PROCEDURE — 250N000011 HC RX IP 250 OP 636: Performed by: STUDENT IN AN ORGANIZED HEALTH CARE EDUCATION/TRAINING PROGRAM

## 2021-04-05 PROCEDURE — 250N000011 HC RX IP 250 OP 636: Performed by: NURSE ANESTHETIST, CERTIFIED REGISTERED

## 2021-04-05 PROCEDURE — 370N000017 HC ANESTHESIA TECHNICAL FEE, PER MIN: Performed by: INTERNAL MEDICINE

## 2021-04-05 PROCEDURE — 710N000012 HC RECOVERY PHASE 2, PER MINUTE: Performed by: INTERNAL MEDICINE

## 2021-04-05 PROCEDURE — 250N000009 HC RX 250: Performed by: NURSE ANESTHETIST, CERTIFIED REGISTERED

## 2021-04-05 PROCEDURE — 82374 ASSAY BLOOD CARBON DIOXIDE: CPT | Performed by: INTERNAL MEDICINE

## 2021-04-05 PROCEDURE — 999N000141 HC STATISTIC PRE-PROCEDURE NURSING ASSESSMENT: Performed by: INTERNAL MEDICINE

## 2021-04-05 PROCEDURE — 258N000003 HC RX IP 258 OP 636: Performed by: NURSE ANESTHETIST, CERTIFIED REGISTERED

## 2021-04-05 PROCEDURE — 36415 COLL VENOUS BLD VENIPUNCTURE: CPT | Performed by: STUDENT IN AN ORGANIZED HEALTH CARE EDUCATION/TRAINING PROGRAM

## 2021-04-05 RX ORDER — ONDANSETRON 4 MG/1
4 TABLET, ORALLY DISINTEGRATING ORAL EVERY 30 MIN PRN
Status: DISCONTINUED | OUTPATIENT
Start: 2021-04-05 | End: 2021-04-05 | Stop reason: HOSPADM

## 2021-04-05 RX ORDER — NALOXONE HYDROCHLORIDE 0.4 MG/ML
0.2 INJECTION, SOLUTION INTRAMUSCULAR; INTRAVENOUS; SUBCUTANEOUS
Status: DISCONTINUED | OUTPATIENT
Start: 2021-04-05 | End: 2021-04-05 | Stop reason: HOSPADM

## 2021-04-05 RX ORDER — ONDANSETRON 2 MG/ML
INJECTION INTRAMUSCULAR; INTRAVENOUS PRN
Status: DISCONTINUED | OUTPATIENT
Start: 2021-04-05 | End: 2021-04-05

## 2021-04-05 RX ORDER — ACETAMINOPHEN 325 MG/1
975 TABLET ORAL ONCE
Status: DISCONTINUED | OUTPATIENT
Start: 2021-04-05 | End: 2021-04-05 | Stop reason: HOSPADM

## 2021-04-05 RX ORDER — FENTANYL CITRATE 50 UG/ML
INJECTION, SOLUTION INTRAMUSCULAR; INTRAVENOUS PRN
Status: DISCONTINUED | OUTPATIENT
Start: 2021-04-05 | End: 2021-04-05

## 2021-04-05 RX ORDER — LIDOCAINE 40 MG/G
CREAM TOPICAL
Status: DISCONTINUED | OUTPATIENT
Start: 2021-04-05 | End: 2021-04-05 | Stop reason: HOSPADM

## 2021-04-05 RX ORDER — LIDOCAINE HYDROCHLORIDE 20 MG/ML
INJECTION, SOLUTION INFILTRATION; PERINEURAL PRN
Status: DISCONTINUED | OUTPATIENT
Start: 2021-04-05 | End: 2021-04-05

## 2021-04-05 RX ORDER — SODIUM CHLORIDE, SODIUM LACTATE, POTASSIUM CHLORIDE, CALCIUM CHLORIDE 600; 310; 30; 20 MG/100ML; MG/100ML; MG/100ML; MG/100ML
INJECTION, SOLUTION INTRAVENOUS CONTINUOUS PRN
Status: DISCONTINUED | OUTPATIENT
Start: 2021-04-05 | End: 2021-04-05

## 2021-04-05 RX ORDER — ALBUTEROL SULFATE 0.83 MG/ML
2.5 SOLUTION RESPIRATORY (INHALATION) EVERY 4 HOURS PRN
Status: DISCONTINUED | OUTPATIENT
Start: 2021-04-05 | End: 2021-04-05 | Stop reason: HOSPADM

## 2021-04-05 RX ORDER — PROPOFOL 10 MG/ML
INJECTION, EMULSION INTRAVENOUS PRN
Status: DISCONTINUED | OUTPATIENT
Start: 2021-04-05 | End: 2021-04-05

## 2021-04-05 RX ORDER — SODIUM CHLORIDE, SODIUM LACTATE, POTASSIUM CHLORIDE, CALCIUM CHLORIDE 600; 310; 30; 20 MG/100ML; MG/100ML; MG/100ML; MG/100ML
INJECTION, SOLUTION INTRAVENOUS CONTINUOUS
Status: DISCONTINUED | OUTPATIENT
Start: 2021-04-05 | End: 2021-04-05 | Stop reason: HOSPADM

## 2021-04-05 RX ORDER — NALOXONE HYDROCHLORIDE 0.4 MG/ML
0.4 INJECTION, SOLUTION INTRAMUSCULAR; INTRAVENOUS; SUBCUTANEOUS
Status: DISCONTINUED | OUTPATIENT
Start: 2021-04-05 | End: 2021-04-05 | Stop reason: HOSPADM

## 2021-04-05 RX ORDER — EPHEDRINE SULFATE 50 MG/ML
INJECTION, SOLUTION INTRAMUSCULAR; INTRAVENOUS; SUBCUTANEOUS PRN
Status: DISCONTINUED | OUTPATIENT
Start: 2021-04-05 | End: 2021-04-05

## 2021-04-05 RX ORDER — ONDANSETRON 2 MG/ML
4 INJECTION INTRAMUSCULAR; INTRAVENOUS EVERY 30 MIN PRN
Status: DISCONTINUED | OUTPATIENT
Start: 2021-04-05 | End: 2021-04-05 | Stop reason: HOSPADM

## 2021-04-05 RX ORDER — DEXAMETHASONE SODIUM PHOSPHATE 4 MG/ML
INJECTION, SOLUTION INTRA-ARTICULAR; INTRALESIONAL; INTRAMUSCULAR; INTRAVENOUS; SOFT TISSUE PRN
Status: DISCONTINUED | OUTPATIENT
Start: 2021-04-05 | End: 2021-04-05

## 2021-04-05 RX ORDER — HYDROMORPHONE HYDROCHLORIDE 1 MG/ML
.3-.5 INJECTION, SOLUTION INTRAMUSCULAR; INTRAVENOUS; SUBCUTANEOUS EVERY 10 MIN PRN
Status: DISCONTINUED | OUTPATIENT
Start: 2021-04-05 | End: 2021-04-05 | Stop reason: HOSPADM

## 2021-04-05 RX ORDER — OXYCODONE HYDROCHLORIDE 5 MG/1
5 TABLET ORAL EVERY 4 HOURS PRN
Status: DISCONTINUED | OUTPATIENT
Start: 2021-04-05 | End: 2021-04-05 | Stop reason: HOSPADM

## 2021-04-05 RX ORDER — FENTANYL CITRATE 50 UG/ML
25-50 INJECTION, SOLUTION INTRAMUSCULAR; INTRAVENOUS EVERY 5 MIN PRN
Status: DISCONTINUED | OUTPATIENT
Start: 2021-04-05 | End: 2021-04-05 | Stop reason: HOSPADM

## 2021-04-05 RX ORDER — FENTANYL CITRATE 50 UG/ML
25-50 INJECTION, SOLUTION INTRAMUSCULAR; INTRAVENOUS
Status: DISCONTINUED | OUTPATIENT
Start: 2021-04-05 | End: 2021-04-05 | Stop reason: HOSPADM

## 2021-04-05 RX ADMIN — DEXAMETHASONE SODIUM PHOSPHATE 4 MG: 4 INJECTION, SOLUTION INTRA-ARTICULAR; INTRALESIONAL; INTRAMUSCULAR; INTRAVENOUS; SOFT TISSUE at 10:06

## 2021-04-05 RX ADMIN — PHENYLEPHRINE HYDROCHLORIDE 100 MCG: 10 INJECTION INTRAVENOUS at 10:19

## 2021-04-05 RX ADMIN — PHENYLEPHRINE HYDROCHLORIDE 100 MCG: 10 INJECTION INTRAVENOUS at 10:06

## 2021-04-05 RX ADMIN — PHENYLEPHRINE HYDROCHLORIDE 100 MCG: 10 INJECTION INTRAVENOUS at 11:15

## 2021-04-05 RX ADMIN — Medication 5 MG: at 10:16

## 2021-04-05 RX ADMIN — FENTANYL CITRATE 100 MCG: 50 INJECTION, SOLUTION INTRAMUSCULAR; INTRAVENOUS at 09:57

## 2021-04-05 RX ADMIN — SUGAMMADEX 200 MG: 100 INJECTION, SOLUTION INTRAVENOUS at 11:23

## 2021-04-05 RX ADMIN — PHENYLEPHRINE HYDROCHLORIDE 100 MCG: 10 INJECTION INTRAVENOUS at 10:16

## 2021-04-05 RX ADMIN — SODIUM CHLORIDE, POTASSIUM CHLORIDE, SODIUM LACTATE AND CALCIUM CHLORIDE: 600; 310; 30; 20 INJECTION, SOLUTION INTRAVENOUS at 09:48

## 2021-04-05 RX ADMIN — HUMAN SECRETIN 12.2 MCG: 16 INJECTION, POWDER, LYOPHILIZED, FOR SOLUTION INTRAVENOUS at 10:22

## 2021-04-05 RX ADMIN — ONDANSETRON 4 MG: 2 INJECTION INTRAMUSCULAR; INTRAVENOUS at 10:06

## 2021-04-05 RX ADMIN — Medication 5 MG: at 10:19

## 2021-04-05 RX ADMIN — PHENYLEPHRINE HYDROCHLORIDE 100 MCG: 10 INJECTION INTRAVENOUS at 10:59

## 2021-04-05 RX ADMIN — ROCURONIUM BROMIDE 50 MG: 10 INJECTION INTRAVENOUS at 09:58

## 2021-04-05 RX ADMIN — LIDOCAINE HYDROCHLORIDE 100 MG: 20 INJECTION, SOLUTION INFILTRATION; PERINEURAL at 09:57

## 2021-04-05 RX ADMIN — PROPOFOL 200 MG: 10 INJECTION, EMULSION INTRAVENOUS at 09:58

## 2021-04-05 RX ADMIN — PHENYLEPHRINE HYDROCHLORIDE 100 MCG: 10 INJECTION INTRAVENOUS at 10:46

## 2021-04-05 ASSESSMENT — MIFFLIN-ST. JEOR: SCORE: 1088.25

## 2021-04-05 NOTE — DISCHARGE INSTRUCTIONS
[facility]  Same-Day Surgery   Adult Discharge Orders & Instructions     For 24 hours after surgery    1. Get plenty of rest.  A responsible adult must stay with you for at least 24 hours after you leave the hospital.   2. Do not drive or use heavy equipment.  If you have weakness or tingling, don't drive or use heavy equipment until this feeling goes away.  3. Do not drink alcohol.  4. Avoid strenuous or risky activities.  Ask for help when climbing stairs.   5. You may feel lightheaded.  IF so, sit for a few minutes before standing.  Have someone help you get up.   6. If you have nausea (feel sick to your stomach): Drink only clear liquids such as apple juice, ginger ale, broth or 7-Up.  Rest may also help.  Be sure to drink enough fluids.  Move to a regular diet as you feel able.  7. You may have a slight fever. Call the doctor if your fever is over 100 F (37.7 C) (taken under the tongue) or lasts longer than 24 hours.  8. You may have a dry mouth, a sore throat, muscle aches or trouble sleeping.  These should go away after 24 hours.  9. Do not make important or legal decisions.   Call your doctor for any of the followin.  Signs of infection (fever, growing tenderness at the surgery site, a large amount of drainage or bleeding, severe pain, foul-smelling drainage, redness, swelling).    2. It has been over 8 to 10 hours since surgery and you are still not able to urinate (pass water).    3.  Headache for over 24 hours.    4.  Numbness, tingling or weakness the day after surgery (if you had spinal anesthesia).  To contact a doctor, call Dr. Pantoja at the Gastroenterology Clinic @ [phone] or:      [phone] and ask for the resident on call for gastroenterology or Dr. Pantoja (answered 24 hours a day)      Emergency Department:  [facility]: [phone]       (TTY for hearing impaired: [phone])

## 2021-04-05 NOTE — ANESTHESIA PREPROCEDURE EVALUATION
Anesthesia Pre-Procedure Evaluation    Patient: Cindy Chung   MRN: [id number] : 1950        Preoperative Diagnosis: Abdominal pain, generalized [R10.84]   Procedure : Procedure(s):  ENDOSCOPIC ULTRASOUND, ESOPHAGOSCOPY / UPPER GASTROINTESTINAL TRACT (GI)  ESOPHAGOGASTRODUODENOSCOPY, WITH CHOLECYSTOKININ-STIMULATED PANCREATIC FUNCTION TEST     Past Medical History:   Diagnosis Date     Alopecia      Constipation      DJD (degenerative joint disease) of cervical spine     with Nerve damage     Glaucoma suspect      Hearing loss      Malignant neoplasm of breast (female), unspecified site     left lumpectomy ,chemo,rad--Dr jimenez     Nasal congestion      Numbness      Osseous obstruction of eustachian tube     ENT, surgery      Osteoporosis      Other and unspecified disc disorder of cervical region      Herniation, neuritis     PONV (postoperative nausea and vomiting)      Ringing in ears      Seborrheic keratosis      Sneezing      Sore throat      Weakness       Past Surgical History:   Procedure Laterality Date     BUNIONECTOMY RT/LT      right - fracture during the surgery     C APPENDECTOMY      during an exploratory lap     C CERV SPINE FUSN,ANTER,BELOW C2      C6-7     COLONOSCOPY  2014    Procedure: COLONOSCOPY;;  Surgeon: Mayelin Gillette MD;  Location: [facility] OR     DX HIP/PELVIS/SPINE  2012    Osteopenia     ENDOSCOPIC ULTRASOUND UPPER GASTROINTESTINAL TRACT (GI)      Normal     ENDOSCOPIC ULTRASOUND UPPER GASTROINTESTINAL TRACT (GI)  2014    Procedure: ENDOSCOPIC ULTRASOUND UPPER GASTROINTESTINAL TRACT (GI);  Endoscopic Ultrasound Upper Gastrointestinal Tract, Colonoscopy ;  Surgeon: Mayelin Gillette MD;  Location: [facility] OR      EXCISION BREAST LESION, OPEN >=1      2 cancers  Reports invasive ductal     HC MASTOIDECTOMY,SIMPLE      for cholesteotoma     [facility] U[facility] ENDOSCOPY, SIMPLE EXAM        Allergies   Allergen Reactions     Bee Venom  Anaphylaxis     Adhesive Tape Blisters     Hay Fever & [A.R.M.]      Penicillin G Unknown     Morphine Rash      Social History     Tobacco Use     Smoking status: Former Smoker     Packs/day: 1.00     Years: 23.00     Pack years: 23.00     Types: Cigarettes     Quit date: 10/1/1991     Years since quittin.5     Smokeless tobacco: Never Used   Substance Use Topics     Alcohol use: No      Wt Readings from Last 1 Encounters:   21 61 kg (134 lb 7.7 oz)        Anesthesia Evaluation   Pt has had prior anesthetic. Type: General.    History of anesthetic complications  - PONV.      ROS/MED HX  ENT/Pulmonary:       Neurologic:       Cardiovascular:    (-) murmur and wheezes   METS/Exercise Tolerance:     Hematologic:       Musculoskeletal:   (+) arthritis (cervical, s/p C6-7 fusion),     GI/Hepatic:       Renal/Genitourinary:       Endo:       Psychiatric/Substance Use:       Infectious Disease:       Malignancy:   (+) Malignancy, History of Breast.Breast CA Remission status post.        Other:            Physical Exam    Airway        Mallampati: II   TM distance: > 3 FB   Neck ROM: full   Mouth opening: > 3 cm    Respiratory Devices and Support         Dental  no notable dental history         Cardiovascular          Rhythm and rate: regular and normal (-) no systolic click and no murmur    Pulmonary   pulmonary exam normal        breath sounds clear to auscultation   (-) no wheezes        OUTSIDE LABS:  CBC:   Lab Results   Component Value Date    WBC 5.9 10/30/2013    HGB 11.8 2014    HGB 12.2 10/30/2013    HCT 36.7 10/30/2013     10/30/2013     BMP:   Lab Results   Component Value Date     2012    POTASSIUM 4.4 2012    CHLORIDE 99 2012    CO2 27 2012    BUN 17 2012    CR 0.64 2012    GLC 93 2012     COAGS: No results found for: PTT, INR, FIBR  POC:   Lab Results   Component Value Date    BGM 85 2021     HEPATIC:   Lab Results   Component  Value Date    ALBUMIN 3.8 09/23/2013    PROTTOTAL 7.2 09/23/2013    ALT 27 09/23/2013    AST 25 09/23/2013    ALKPHOS 45 09/23/2013    BILITOTAL 0.2 09/23/2013     OTHER:   Lab Results   Component Value Date    A1C 5.9 04/26/2013    JHONY 9.2 06/19/2012    LIPASE 72 09/23/2013    AMYLASE 85 09/23/2013    TSH 2.16 06/19/2012       Anesthesia Plan    ASA Status:  3   NPO Status:  NPO Appropriate    Anesthesia Type: General.     - Airway: ETT   Induction: Intravenous.   Maintenance: Inhalation.   Techniques and Equipment:     - Airway: Video-Laryngoscope         Consents    Anesthesia Plan(s) and associated risks, benefits, and realistic alternatives discussed. Questions answered and patient/representative(s) expressed understanding.     - Discussed with:  Patient      - Extended Intubation/Ventilatory Support Discussed: Yes.      - Patient is DNR/DNI Status: No    Use of blood products discussed: Yes.     - Discussed with: Patient.     Postoperative Care    Pain management: IV analgesics.   PONV prophylaxis: Ondansetron (or other 5HT-3), Dexamethasone or Solumedrol     Comments:                Christopher J. Behrens, MD

## 2021-04-05 NOTE — PROGRESS NOTES
12:17 Verbal Sign Out obtained from   Dr Behrens MDA.    12:12 Patient  Nicholas called and Updated with Status and will be called when ready for pickup in Phase 2.

## 2021-04-05 NOTE — ANESTHESIA PROCEDURE NOTES
Airway       Patient location during procedure: OR  Staff -        CRNA: Cari Collins APRN CRNA       Performed By: CRNA  Consent for Airway        Urgency: elective  Indications and Patient Condition       Indications for airway management: av-procedural       Induction type:intravenous       Mask difficulty assessment: 1 - vent by mask    Final Airway Details       Final airway type: endotracheal airway       Successful airway: ETT - single and Oral  Endotracheal Airway Details        ETT size (mm): 7.0       Cuffed: yes       Successful intubation technique: video laryngoscopy       VL Blade Size: MAC 3       Grade View of Cords: 1       Adjucts: stylet       Position: Right       Measured from: gums/teeth       Secured at (cm): 21       Bite block used: None    Post intubation assessment        Placement verified by: capnometry and chest rise        Number of attempts at approach: 1       Secured with: pink tape       Ease of procedure: easy       Dentition: Intact and Unchanged    Medication(s) Administered   Medication Administration Time: 4/5/2021 9:59 AM

## 2021-04-05 NOTE — ANESTHESIA POSTPROCEDURE EVALUATION
Patient: Cindy Chung    Procedure(s):  ENDOSCOPIC ULTRASOUND, ESOPHAGOSCOPY / UPPER GASTROINTESTINAL TRACT (GI)  ESOPHAGOGASTRODUODENOSCOPY, WITH CHOLECYSTOKININ-STIMULATED PANCREATIC FUNCTION TEST    Diagnosis:Abdominal pain, generalized [R10.84]  Diagnosis Additional Information: No value filed.    Anesthesia Type:  General    Note:  Disposition: Outpatient   Postop Pain Control: Uneventful            Sign Out: Well controlled pain   PONV: No   Neuro/Psych: Uneventful            Sign Out: Acceptable/Baseline neuro status   Airway/Respiratory: Uneventful            Sign Out: Acceptable/Baseline resp. status   CV/Hemodynamics: Uneventful            Sign Out: Acceptable CV status   Other NRE: NONE   DID A NON-ROUTINE EVENT OCCUR? No         Last vitals:  Vitals:    04/05/21 1145 04/05/21 1200 04/05/21 1215   BP: 105/60 104/63 101/61   Pulse: 75 64 75   Resp: 16 18    Temp:      SpO2: 99% 98% 93%       Last vitals prior to Anesthesia Care Transfer:  CRNA VITALS  4/5/2021 1106 - 4/5/2021 1206      4/5/2021             NIBP:  114/67    Pulse:  85    NIBP Mean:  82    Temp:  36.6  C (97.8  F)    SpO2:  100 %    Resp Rate (observed):  12          Electronically Signed By: Christopher J. Behrens, MD  April 5, 2021  12:23 PM

## 2021-04-05 NOTE — PROGRESS NOTES
RN RECOVERY NOTE  Assigned as pt nurse while pt recovering in phase II prior to discharging home   Report from Natalee BASURTO via telephone @ 1250  Pt ARRIVE to 3C @ 1300  Dr. Ovalle @ pt bedside @ 1300   NST assisting pt dressing, transferring from cart to chair, and dressing   All belongings returned at this time   No report of any items missing   Pt sitting up in chair alert and oriented. Following commands and answering questions appropriately  Pt tolerating po intake (clear liquids) in pacu and continues to due so in phase II  Call light within reach. Pt verbalizes understanding of use    Discharge instructions printed and provided for pt to take upon discharge. Instructions reviewed over the phone with  Nicholas. Questions encouraged and answered. Education provided. Over the phone consent obtained (see form for further details)   PIV removed   Pt transport placed to take pt down to [facility] via wheelchair

## 2021-05-05 ENCOUNTER — VIRTUAL VISIT (OUTPATIENT)
Dept: GASTROENTEROLOGY | Facility: CLINIC | Age: 71
End: 2021-05-05
Payer: COMMERCIAL

## 2021-05-05 VITALS — BODY MASS INDEX: 24.48 KG/M2 | HEIGHT: 62 IN | WEIGHT: 133 LBS

## 2021-05-05 DIAGNOSIS — R10.84 ABDOMINAL PAIN, GENERALIZED: Primary | ICD-10-CM

## 2021-05-05 PROCEDURE — 99214 OFFICE O/P EST MOD 30 MIN: CPT | Mod: 95 | Performed by: INTERNAL MEDICINE

## 2021-05-05 ASSESSMENT — PAIN SCALES - GENERAL: PAINLEVEL: MODERATE PAIN (5)

## 2021-05-05 ASSESSMENT — MIFFLIN-ST. JEOR: SCORE: 1081.53

## 2021-05-05 NOTE — PROGRESS NOTES
Alexandra is a 71 year old who is being evaluated via a billable video visit.      Please send email link invite to [email or web address]  How would you like to obtain your AVS? Mail a copy  If the video visit is dropped, the invitation should be resent by: email to [email or web address]  Will anyone else be joining your video visit? No    Start: 05/05/2021 01:54 pm  Stop: 05/05/2021 02:12 pm  Video-Visit Details    Type of service:  Video Visit    Video End Time:2:07 PM    Originating Location (pt. Location): Home    Distant Location (provider location):  [facility] [place]     Platform used for Video Visit: [facility]     Follow-up constant epigastric pain. EUS non specific findings (3/9 minor criteria), normal pancreatic function tests, mildly dilated bile duct (9.5mm) which is likely due to many decades of opioid use, all normal liver chemstries, lipase (except one low value in past). GB ejection fraction normal.   We discussed that there is no evidence of pancreatic or biliary disease other than mildly dilated bile duct likely due to opioids. Would not recommend cholecystectomy, and definitely not ERCP due to risk and low chance of benefit and even chance of exacerbation. Would do trial of low dose pancreatic enzymes if they might help post prandial exacerbation. Reemphasized that there is very low chance of finding anatomic cause or interventable source of pain. Pt seemed understanding. Total 20 minutes phone, 5 minutes reviewing imaging and data.    Plan:  Creon 24K lipase, one  Ea meal increase to two as tolerated, #50 5 refills , stop if not working  Follow-up prn.

## 2021-05-05 NOTE — NURSING NOTE
"Chief Complaint   Patient presents with     RECHECK     RTC after EUS and HIDA       Vitals:    05/05/21 1322   Weight: 60.3 kg (133 lb)   Height: 1.575 m (5' 2\")       Body mass index is 24.33 kg/m .      Barrera Mantilla LPN                        "

## 2021-05-05 NOTE — LETTER
5/5/2021         RE: Cindy Chung  [address]  [address]  [place] [address]        Dear Colleague,    Thank you for referring your patient, Cindy Chung, to the [facility]. Please see a copy of my visit note below.    Rick is a 71 year old who is being evaluated via a billable video visit.      Please send email link invite to [email or web address]  How would you like to obtain your AVS? Mail a copy  If the video visit is dropped, the invitation should be resent by: email to [email or web address]  Will anyone else be joining your video visit? No    Start: 05/05/2021 01:54 pm  Stop: 05/05/2021 02:12 pm    Video-Visit Details    Type of service:  Video Visit    Video End Time:2:07 PM    Originating Location (pt. Location): Home    Distant Location (provider location):  [facility]     Platform used for Video Visit: [facility]     Follow-up constant epigastric pain. EUS non specific findings (3/9 minor criteria), normal pancreatic function tests, mildly dilated bile duct (9.5mm) which is likely due to many decades of opioid use, all normal liver chemstries, lipase (except one low value in past). GB ejection fraction normal.   We discussed that there is no evidence of pancreatic or biliary disease other than mildly dilated bile duct likely due to opioids. Would not recommend cholecystectomy, and definitely not ERCP due to risk and low chance of benefit and even chance of exacerbation. Would do trial of low dose pancreatic enzymes if they might help post prandial exacerbation. Reemphasized that there is very low chance of finding anatomic cause or interventable source of pain. Pt seemed understanding. Total 20 minutes phone, 5 minutes reviewing imaging and data.    Plan:  Creon 24K lipase, one  Ea meal increase to two as tolerated, #50 5 refills , stop if not working  Follow-up prn.    Again, thank you for  allowing me to participate in the care of your patient.      Sincerely,    Diego Chao MD

## 2021-05-06 ENCOUNTER — TELEPHONE (OUTPATIENT)
Dept: GASTROENTEROLOGY | Facility: CLINIC | Age: 71
End: 2021-05-06

## 2021-05-06 NOTE — TELEPHONE ENCOUNTER
Called Pt to follow up on clinic visit with Dr. Chao yesterday. Pt does not have any questions. Discussed Creon has been sent to her pharmacy. Informed Pt that the sig says, take 1-2 with meals, but to start with 1 capsule and increase to 2 with meals as she can tolerate. Pt states ok and understanding.    Barrera Mantilla LPN

## 2021-05-06 NOTE — PATIENT INSTRUCTIONS
Follow up:    Dr. Chao has outlined the following steps after your recent clinic visit:    -Start taking Creon 24K, one capsule with meal and then increase to two as tolerated. Stop taking Creon if it is not working    -Follow-up as needed      Please call with any questions or concerns regarding your clinic visit today.    It is a pleasure being involved in your health care.    Contacts post-consultation depending on your need:    Schedule Clinic Appointments            [phone] # 1   M-F 7:30 - 5 pm    Stephanie Monson RN Care Coordinator  [phone]    Barrera Mantilla LPN    [phone]     OR Procedure Scheduling                             [phone]    My Chart is available 24 hours a day and is a secure way to access your records and communicate with your care team.  I strongly recommend signing up if you haven't already done so, if you are comfortable with computers.  If you would like to inquire about this or are having problems with My Chart access, you may call [phone] or go online at [email or web address].  Please allow at least 24 hours for a response and extra time on weekends and Holidays.

## 2021-05-07 ENCOUNTER — TELEPHONE (OUTPATIENT)
Dept: GASTROENTEROLOGY | Facility: CLINIC | Age: 71
End: 2021-05-07

## 2021-05-07 NOTE — TELEPHONE ENCOUNTER
Pt had EUS with Dr. Pantoja on 4/5- called to answer questions. Patient wonders if pain could be caused by MALS, advised a follow up appt with Jeremie to discuss. Visit scheduled 4/26    ML

## 2021-05-07 NOTE — TELEPHONE ENCOUNTER
[facility] Call Center    Phone Message    May a detailed message be left on voicemail: yes     Reason for Call: Other: Cindy calling to request a call back. She would like to speak to her care team in regards to her Endoscopic US done on 04/05. Please call her back to discuss.      Action Taken: Message routed to:  [facility] cassi daley     Travel Screening: Not Applicable

## 2021-05-26 ENCOUNTER — VIRTUAL VISIT (OUTPATIENT)
Dept: GASTROENTEROLOGY | Facility: CLINIC | Age: 71
End: 2021-05-26
Payer: COMMERCIAL

## 2021-05-26 VITALS — HEIGHT: 62 IN | WEIGHT: 133 LBS | BODY MASS INDEX: 24.48 KG/M2

## 2021-05-26 DIAGNOSIS — R10.84 ABDOMINAL PAIN, GENERALIZED: Primary | ICD-10-CM

## 2021-05-26 PROCEDURE — 99213 OFFICE O/P EST LOW 20 MIN: CPT | Mod: 95 | Performed by: INTERNAL MEDICINE

## 2021-05-26 ASSESSMENT — PAIN SCALES - GENERAL: PAINLEVEL: SEVERE PAIN (6)

## 2021-05-26 ASSESSMENT — MIFFLIN-ST. JEOR: SCORE: 1081.53

## 2021-05-26 NOTE — NURSING NOTE
"Chief Complaint   Patient presents with     RECHECK     wants opinion on MALS as cause of abd pain       Vitals:    05/26/21 1334   Weight: 60.3 kg (133 lb)   Height: 1.575 m (5' 2\")       Body mass index is 24.33 kg/m .      Barrera Mantilla LPN                        "

## 2021-05-26 NOTE — PROGRESS NOTES
Rick is a 71 year old who is being evaluated via a billable video visit.      How would you like to obtain your AVS? Mail a copy  If the video visit is dropped, the invitation should be resent by: Send to e-mail at: [email or web address]  Will anyone else be joining your video visit? No    Start: 05/26/2021 03:02 pm  Stop: 05/26/2021 03:20 pm  Video-Visit Details    Type of service:  Video Visit    Video End Time:3:21 PM    Originating Location (pt. Location): Home    Distant Location (provider location):  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 No evidence on any imaging, and nebulous diagnosis. 2) only objective finding is persistently dilated common bile duct, 1.1 cm at last MRCP, depsite gallbladder still in. We brought up remote possibility this could e biliary sphincter dysfunction. Discussed high risks, highly uncertain benefit of ERCP. I was not enthusiastic. Will revisit in clinic in 6 weeks - 10 weeks

## 2021-05-26 NOTE — LETTER
5/26/2021         RE: Cindy Chung  [address]  [address]  [place] [address]        Dear Colleague,    Thank you for referring your patient, Cindy Chung, to the [facility]. Please see a copy of my visit note below.    Rick is a 71 year old who is being evaluated via a billable video visit.      How would you like to obtain your AVS? Mail a copy  If the video visit is dropped, the invitation should be resent by: Send to e-mail at: [email or web address]  Will anyone else be joining your video visit? No    Start: 05/26/2021 03:02 pm  Stop: 05/26/2021 03:20 pm    Video-Visit Details    Type of service:  Video Visit    Video End Time:3:21 PM    Originating Location (pt. 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 No evidence on any imaging, and nebulous diagnosis. 2) only objective finding is persistently dilated common bile duct, 1.1 cm at last MRCP, depsite gallbladder still in. We brought up remote possibility this could e biliary sphincter dysfunction. Discussed high risks, highly uncertain benefit of ERCP. I was not enthusiastic. Will revisit in clinic in 6 weeks - 10 weeks    Again, thank you for allowing me to participate in the care of your patient.      Sincerely,    Diego Chao MD

## 2021-05-27 ENCOUNTER — TELEPHONE (OUTPATIENT)
Dept: GASTROENTEROLOGY | Facility: CLINIC | Age: 71
End: 2021-05-27

## 2021-05-27 NOTE — PATIENT INSTRUCTIONS
Follow up in 6-10 weeks with Dr. Chao.       Please call with any questions or concerns regarding your clinic visit today.    It is a pleasure being involved in your health care.    Contacts post-consultation depending on your need:    Schedule Clinic Appointments            [phone] # 1   M-F 7:30 - 5 pm    Stephanie Monson RN Care Coordinator  [phone]    Barrera Mantilla LPN    [phone]     OR Procedure Scheduling                             [phone]    My Chart is available 24 hours a day and is a secure way to access your records and communicate with your care team.  I strongly recommend signing up if you haven't already done so, if you are comfortable with computers.  If you would like to inquire about this or are having problems with My Chart access, you may call [phone] or go online at [email or web address].  Please allow at least 24 hours for a response and extra time on weekends and Holidays.

## 2021-05-27 NOTE — TELEPHONE ENCOUNTER
Called Pt to follow up on clinic visit yesterday with Dr. Chao. No answer. Left VM for Pt to call back.    AVS mailed to Pt.    Barrera Mantilla LPN

## 2021-07-21 ENCOUNTER — VIRTUAL VISIT (OUTPATIENT)
Dept: GASTROENTEROLOGY | Facility: CLINIC | Age: 71
End: 2021-07-21
Payer: COMMERCIAL

## 2021-07-21 VITALS — WEIGHT: 135 LBS | BODY MASS INDEX: 24.84 KG/M2 | HEIGHT: 62 IN

## 2021-07-21 DIAGNOSIS — R10.13 ABDOMINAL PAIN, EPIGASTRIC: Primary | ICD-10-CM

## 2021-07-21 PROCEDURE — 99214 OFFICE O/P EST MOD 30 MIN: CPT | Mod: 95 | Performed by: INTERNAL MEDICINE

## 2021-07-21 ASSESSMENT — PAIN SCALES - GENERAL: PAINLEVEL: SEVERE PAIN (7)

## 2021-07-21 ASSESSMENT — MIFFLIN-ST. JEOR: SCORE: 1090.61

## 2021-07-21 NOTE — NURSING NOTE
"Chief Complaint   Patient presents with     Follow Up     Follow up for abdominal pain       Vitals:    07/21/21 1200   Weight: 135 lb   Height: 5' 2\"       Body mass index is 24.69 kg/m .       Jessica Fofana CMA    "

## 2021-07-21 NOTE — LETTER
7/21/2021         RE: Cindy Chung  [address]  [address]  [place] [address]        Dear Colleague,    Thank you for referring your patient, Cindy Chung, to the [facility] [place]. Please see a copy of my visit note below.    Questions from pt  1) Genetics: maternal GM had breast cancer. Neg abd mrcp at [facility]. Negative EUS. V unlikely  2) Father had an ulcer, EUS negative  3) Vit deficiency. Not a cause of dilated bile duct. Opioids most likely cause of dilated CBD.  4) Had 9mm duct in 2009. EUS at [facility]  5) Does the bile duct rupture? No       Explained that I am reluctant to recommend cholecystectomy and ERCP. She is trying to search for cause, which I doubt that we will find.   PLAN: follow-up prn.     MF      Again, thank you for allowing me to participate in the care of your patient.        Sincerely,    Diego Chao MD

## 2021-07-21 NOTE — PROGRESS NOTES
Rick is a 71 year old who is being evaluated via a billable video visit.      How would you like to obtain your AVS? Mail a copy  If the video visit is dropped, the invitation should be resent by: Send to e-mail at: [email or web address]  Will anyone else be joining your video visit? No         Video-Visit Details    Type of service:  Video Visit    Start: 07/21/2021 12:47 pm  Stop: 07/21/2021 01:18 pm    Originating Location (pt. Location): Home    Distant Location (provider location):  [facility]     Platform used for Video Visit: [employer]     Questions from pt  1) Genetics: maternal GM had breast cancer. Neg abd mrcp at [facility]. Negative EUS. V unlikely  2) Father had an ulcer, EUS negative  3) Vit deficiency. Not a cause of dilated bile duct. Opioids most likely cause of dilated CBD.  4) Had 9mm duct in 2009. EUS at [facility]  5) Does the bile duct rupture? No       Explained that I am reluctant to recommend cholecystectomy and ERCP. She is trying to search for cause, which I doubt that we will find.   PLAN: follow-up prn.     MF

## 2021-07-21 NOTE — PATIENT INSTRUCTIONS
Follow up Dr. Chao as needed.      Please call with any questions or concerns regarding your clinic visit today.    It is a pleasure being involved in your health care.    Contacts post-consultation depending on your need:    Schedule Clinic Appointments            [phone] # 1   M-F 7:30 - 5 pm    Stephanie Monson RN Care Coordinator  [phone]    Barrera Mantilla LPN    [phone]     OR Procedure Scheduling                             [phone]    My Chart is available 24 hours a day and is a secure way to access your records and communicate with your care team.  I strongly recommend signing up if you haven't already done so, if you are comfortable with computers.  If you would like to inquire about this or are having problems with My Chart access, you may call [phone] or go online at [email or web address].  Please allow at least 24 hours for a response and extra time on weekends and Holidays.

## 2021-07-22 ENCOUNTER — DOCUMENTATION ONLY (OUTPATIENT)
Dept: GASTROENTEROLOGY | Facility: CLINIC | Age: 71
End: 2021-07-22

## 2021-11-26 NOTE — TELEPHONE ENCOUNTER
[facility]    Phone Message    May a detailed message be left on voicemail: yes     Reason for Call: Requesting Results   Name/type of test: NM Hepatobiliary Scan  Date of test: 3/23/21  Was test done at a location other than [facility]?: No  - [address] [facility]         Action Taken: Message routed to:  [facility] Júnior Sanches    Travel Screening: Not Applicable                                                                       Family

## 2022-01-11 NOTE — ANESTHESIA CARE TRANSFER NOTE
Patient: Cindy Chung    Procedure(s):  ENDOSCOPIC ULTRASOUND, ESOPHAGOSCOPY / UPPER GASTROINTESTINAL TRACT (GI)  ESOPHAGOGASTRODUODENOSCOPY, WITH CHOLECYSTOKININ-STIMULATED PANCREATIC FUNCTION TEST    Diagnosis: Abdominal pain, generalized [R10.84]  Diagnosis Additional Information: No value filed.    Anesthesia Type:   General     Note:    Oropharynx: oropharynx clear of all foreign objects and spontaneously breathing  Level of Consciousness: awake  Oxygen Supplementation: nasal cannula  Level of Supplemental Oxygen (L/min / FiO2): 3  Independent Airway: airway patency satisfactory and stable  Dentition: dentition unchanged  Vital Signs Stable: post-procedure vital signs reviewed and stable  Report to RN Given: handoff report given  Patient transferred to: PACU    Handoff Report: Identifed the Patient, Identified the Reponsible Provider, Reviewed the pertinent medical history, Discussed the surgical course, Reviewed Intra-OP anesthesia mangement and issues during anesthesia, Set expectations for post-procedure period and Allowed opportunity for questions and acknowledgement of understanding      Vitals: (Last set prior to Anesthesia Care Transfer)  CRNA VITALS  4/5/2021 1106 - 4/5/2021 1144      4/5/2021             NIBP:  114/67    Pulse:  85    NIBP Mean:  82    Temp:  36.6  C (97.8  F)    SpO2:  100 %    Resp Rate (observed):  12        Electronically Signed By: REBEKAH Means CRNA  April 5, 2021  11:44 AM   Hemigard Postcare Instructions: The HEMIGARD strips are to remain completely dry for at least 5-7 days.

## (undated) DEVICE — SUCTION MANIFOLD NEPTUNE 2 SYS 4 PORT 0702-020-000

## (undated) DEVICE — ENDO BITE BLOCK ADULT OMNI-BLOC

## (undated) DEVICE — KIT ENDO FIRST STEP DISINFECTANT 200ML W/POUCH EP-4

## (undated) DEVICE — SOL WATER IRRIG 1000ML BOTTLE 2F7114

## (undated) DEVICE — SPECIMEN TRAP MUCOUS 40ML LUKI C30200A

## (undated) DEVICE — ENDO TUBING CO2 SMARTCAP STERILE DISP 100145CO2EXT

## (undated) DEVICE — ENDO PROBE COVER ULTRASOUND BALLOON LATEX  MAJ-249

## (undated) DEVICE — PACK ENDOSCOPY GI CUSTOM UMMC

## (undated) DEVICE — PAD CHUX UNDERPAD 23X24" 7136

## (undated) RX ORDER — FENTANYL CITRATE 50 UG/ML
INJECTION, SOLUTION INTRAMUSCULAR; INTRAVENOUS
Status: DISPENSED
Start: 2021-04-05

## (undated) RX ORDER — PROPOFOL 10 MG/ML
INJECTION, EMULSION INTRAVENOUS
Status: DISPENSED
Start: 2021-04-05

## (undated) RX ORDER — LIDOCAINE HYDROCHLORIDE 20 MG/ML
INJECTION, SOLUTION EPIDURAL; INFILTRATION; INTRACAUDAL; PERINEURAL
Status: DISPENSED
Start: 2021-04-05

## (undated) RX ORDER — DEXAMETHASONE SODIUM PHOSPHATE 4 MG/ML
INJECTION, SOLUTION INTRA-ARTICULAR; INTRALESIONAL; INTRAMUSCULAR; INTRAVENOUS; SOFT TISSUE
Status: DISPENSED
Start: 2021-04-05